# Patient Record
Sex: MALE | Race: OTHER | HISPANIC OR LATINO | Employment: FULL TIME | URBAN - NONMETROPOLITAN AREA
[De-identification: names, ages, dates, MRNs, and addresses within clinical notes are randomized per-mention and may not be internally consistent; named-entity substitution may affect disease eponyms.]

---

## 2023-06-08 ENCOUNTER — OFFICE VISIT (OUTPATIENT)
Dept: FAMILY MEDICINE CLINIC | Facility: CLINIC | Age: 48
End: 2023-06-08
Payer: COMMERCIAL

## 2023-06-08 VITALS
OXYGEN SATURATION: 99 % | WEIGHT: 211 LBS | BODY MASS INDEX: 29.54 KG/M2 | SYSTOLIC BLOOD PRESSURE: 126 MMHG | HEART RATE: 61 BPM | HEIGHT: 71 IN | TEMPERATURE: 97.2 F | DIASTOLIC BLOOD PRESSURE: 62 MMHG

## 2023-06-08 DIAGNOSIS — Z11.3 ROUTINE SCREENING FOR STI (SEXUALLY TRANSMITTED INFECTION): Primary | ICD-10-CM

## 2023-06-08 DIAGNOSIS — E11.9 TYPE 2 DIABETES MELLITUS WITHOUT COMPLICATION, WITHOUT LONG-TERM CURRENT USE OF INSULIN (HCC): ICD-10-CM

## 2023-06-08 DIAGNOSIS — R30.0 DYSURIA: ICD-10-CM

## 2023-06-08 PROBLEM — F43.10 PTSD (POST-TRAUMATIC STRESS DISORDER): Status: ACTIVE | Noted: 2023-06-08

## 2023-06-08 PROCEDURE — 81001 URINALYSIS AUTO W/SCOPE: CPT | Performed by: FAMILY MEDICINE

## 2023-06-08 PROCEDURE — 87591 N.GONORRHOEAE DNA AMP PROB: CPT | Performed by: FAMILY MEDICINE

## 2023-06-08 PROCEDURE — 87491 CHLMYD TRACH DNA AMP PROBE: CPT | Performed by: FAMILY MEDICINE

## 2023-06-08 PROCEDURE — 99204 OFFICE O/P NEW MOD 45 MIN: CPT | Performed by: FAMILY MEDICINE

## 2023-06-08 NOTE — PROGRESS NOTES
Assessment/Plan:    Patient is a 53-year-old male with past medical history of type 2 diabetes mellitus and PTSD who presents to establish care  Regarding diabetes patient reports that he was previously on insulin and transition to metformin, however, patient reports that he discontinued pharmacotherapy and opted for lifestyle modifications-has not been monitoring blood sugars to date  PCP to obtain baseline lab work and assess need for resuming pharmacotherapy  Patient also reports concern of dysuria in the setting of moderate risk sexual activity, PCP to obtain UA and routine STI screenings at this time  No problem-specific Assessment & Plan notes found for this encounter  Diagnoses and all orders for this visit:    Type 2 diabetes mellitus without complication, without long-term current use of insulin (HCC)  -     CBC and differential; Future  -     Comprehensive metabolic panel; Future  -     Lipid panel; Future  -     Hemoglobin A1C; Future    Routine screening for STI (sexually transmitted infection)  -     Hepatitis C antibody; Future  -     HIV 1/2 AG/AB w Reflex [facility] for 2 yr old and above; Future  -     Chlamydia/GC amplified DNA by PCR; Future    Dysuria  -     UA w Reflex to Microscopic w Reflex to Culture - Clinic Collect        Subjective:      Patient ID: Jose M Kuhn is a 57 y o  male      Patient is a 53-year-old male with past medical history of type 2 diabetes mellitus and depression who presents to establish care  Patient reports that he was diagnosed with diabetes over ago 2-3 years and was apparently started on insulin at that time  Patient reports that he was not compliant with insulin as he was not comfortable with injections and was transition to metformin  Patient reports that he took metformin sparingly and decided to pursue lifestyle modifications to improve A1c  Patient notes that he has been off medications for at least 1 year at this time    Difficulty Urinating   This "is a recurrent problem  The current episode started 1 to 4 weeks ago  The problem occurs intermittently  The problem has been waxing and waning  The quality of the pain is described as burning  The pain is mild  There has been no fever  He is sexually active  There is no history of pyelonephritis  Pertinent negatives include no chills, frequency, hematuria, nausea or vomiting  Treatments tried: Reports that he tried cranberry juice and over-the-counter Azo  Patient admits that he has had sexual encounters outside of his marriage, and would like to be evaluated for STIs  Patient denies any penile discharge, penile rashes or scrotal pain/swelling  Depression Screening and Follow-up Plan: Patient was screened for depression during today's encounter  They screened negative with a PHQ-2 score of 2  The following portions of the patient's history were reviewed and updated as appropriate: allergies, current medications, past family history, past medical history, past social history, past surgical history and problem list     Review of Systems   Constitutional: Negative for chills and fever  Respiratory: Negative for shortness of breath  Cardiovascular: Negative for chest pain  Gastrointestinal: Negative for nausea and vomiting  Genitourinary: Positive for dysuria  Negative for frequency, hematuria, penile discharge, penile pain, penile swelling and scrotal swelling  Skin: Negative for rash  Objective:      /62   Pulse 61   Temp (!) 97 2 °F (36 2 °C)   Ht 5' 10 5\" (1 791 m)   Wt 95 7 kg (211 lb)   SpO2 99%   BMI 29 85 kg/m²          Physical Exam  Constitutional:       Appearance: He is well-developed  HENT:      Head: Normocephalic and atraumatic        Right Ear: Tympanic membrane, ear canal and external ear normal       Left Ear: Tympanic membrane, ear canal and external ear normal       Nose: Nose normal    Eyes:      Conjunctiva/sclera: Conjunctivae normal  " Cardiovascular:      Rate and Rhythm: Normal rate and regular rhythm  Heart sounds: Normal heart sounds  Pulmonary:      Effort: Pulmonary effort is normal       Breath sounds: Normal breath sounds  Abdominal:      General: Bowel sounds are normal       Palpations: Abdomen is soft  Musculoskeletal:         General: Normal range of motion  Cervical back: Normal range of motion and neck supple  Skin:     General: Skin is warm and dry  Neurological:      Mental Status: He is alert and oriented to person, place, and time

## 2023-06-09 LAB
BACTERIA UR QL AUTO: NORMAL /HPF
BILIRUB UR QL STRIP: NEGATIVE
C TRACH DNA SPEC QL NAA+PROBE: NEGATIVE
CLARITY UR: CLEAR
COLOR UR: ABNORMAL
GLUCOSE UR STRIP-MCNC: NEGATIVE MG/DL
HGB UR QL STRIP.AUTO: ABNORMAL
KETONES UR STRIP-MCNC: NEGATIVE MG/DL
LEUKOCYTE ESTERASE UR QL STRIP: NEGATIVE
N GONORRHOEA DNA SPEC QL NAA+PROBE: NEGATIVE
NITRITE UR QL STRIP: NEGATIVE
NON-SQ EPI CELLS URNS QL MICRO: NORMAL /HPF
PH UR STRIP.AUTO: 6.5 [PH]
PROT UR STRIP-MCNC: ABNORMAL MG/DL
RBC #/AREA URNS AUTO: NORMAL /HPF
SP GR UR STRIP.AUTO: 1.01 (ref 1–1.03)
UROBILINOGEN UR STRIP-ACNC: <2 MG/DL
WBC #/AREA URNS AUTO: NORMAL /HPF

## 2023-06-12 ENCOUNTER — TELEPHONE (OUTPATIENT)
Dept: FAMILY MEDICINE CLINIC | Facility: CLINIC | Age: 48
End: 2023-06-12

## 2023-06-12 NOTE — RESULT ENCOUNTER NOTE
Hi Ladies,    Can we please inform patient that urine testing did not show infection or blood that is out of proportion for expected  Additionally, Chlamydia/gonorrhea is negative as well  Blood work is still pending at this time  Will send results for blood work when it is available      Thanks,  Oscar Fitch

## 2023-06-12 NOTE — TELEPHONE ENCOUNTER
----- Message from Ellouise Mohs, MD sent at 6/12/2023 10:38 AM EDT -----  Hi Ladies,    Can we please inform patient that urine testing did not show infection or blood that is out of proportion for expected  Additionally, Chlamydia/gonorrhea is negative as well  Blood work is still pending at this time  Will send results for blood work when it is available      Thanks,  Katelyn Vaughan

## 2023-06-12 NOTE — TELEPHONE ENCOUNTER
Attempted to call Patient with result notes from Doctor   Eric Quinn #[id number] left a voicemail to call The Office

## 2023-06-13 ENCOUNTER — TELEPHONE (OUTPATIENT)
Dept: FAMILY MEDICINE CLINIC | Facility: CLINIC | Age: 48
End: 2023-06-13

## 2023-06-13 DIAGNOSIS — M54.12 CERVICAL RADICULOPATHY: ICD-10-CM

## 2023-06-13 DIAGNOSIS — M25.511 CHRONIC RIGHT SHOULDER PAIN: Primary | ICD-10-CM

## 2023-06-13 DIAGNOSIS — G89.29 CHRONIC RIGHT SHOULDER PAIN: Primary | ICD-10-CM

## 2023-06-13 NOTE — TELEPHONE ENCOUNTER
Patient inquired about a PT referral for his right arm  He notes the pain is localized where his surgery/procedure previously took placed  I noted this would be sent over to Dr Ney Castañeda and we could provide a call with follow up

## 2023-06-14 NOTE — TELEPHONE ENCOUNTER
Hi Ladies,    I did go ahead and place the referral, we did not discuss his chronic shoulder pain during our last visit  However, it is certainly noted in his EMR  Please ensure that patient keeps follow up appointment 7/20 to discuss progress      Thanks,  Dr ELIZONDO

## 2023-06-15 NOTE — TELEPHONE ENCOUNTER
Unable to reach patient, left  with the following information:     Physical therapy referral had been placed by Dr Cortez Crockett  Therefore, patient can reach out to our physical therapy office or stop in to schedule an appointment